# Patient Record
Sex: MALE | Race: WHITE | Employment: OTHER | URBAN - METROPOLITAN AREA
[De-identification: names, ages, dates, MRNs, and addresses within clinical notes are randomized per-mention and may not be internally consistent; named-entity substitution may affect disease eponyms.]

---

## 2018-03-27 ENCOUNTER — HOSPITAL ENCOUNTER (OUTPATIENT)
Dept: MRI IMAGING | Age: 73
Discharge: HOME | End: 2018-03-27
Attending: ORTHOPAEDIC SURGERY
Payer: MEDICARE

## 2018-03-27 DIAGNOSIS — M75.42 IMPINGEMENT SYNDROME OF LEFT SHOULDER: ICD-10-CM

## 2018-03-27 PROCEDURE — 73221 MRI JOINT UPR EXTREM W/O DYE: CPT

## 2018-04-26 ENCOUNTER — HOSPITAL ENCOUNTER (OUTPATIENT)
Dept: PET IMAGING | Age: 73
Discharge: HOME | End: 2018-04-26
Attending: UROLOGY
Payer: MEDICARE

## 2018-04-26 VITALS — HEIGHT: 71 IN | BODY MASS INDEX: 30.1 KG/M2 | WEIGHT: 215 LBS

## 2018-04-26 DIAGNOSIS — C61 MALIGNANT NEOPLASM OF PROSTATE (HCC): ICD-10-CM

## 2018-04-26 PROCEDURE — 78815 PET IMAGE W/CT SKULL-THIGH: CPT

## 2018-04-26 RX ORDER — SODIUM CHLORIDE 0.9 % (FLUSH) 0.9 %
10 SYRINGE (ML) INJECTION
Status: COMPLETED | OUTPATIENT
Start: 2018-04-26 | End: 2018-04-26

## 2018-04-26 RX ADMIN — Medication 10 ML: at 17:40

## 2018-08-17 PROBLEM — M72.0 DUPUYTREN'S CONTRACTURE OF BOTH HANDS: Status: ACTIVE | Noted: 2018-08-17

## 2018-12-05 ENCOUNTER — ANESTHESIA (OUTPATIENT)
Dept: ENDOSCOPY | Age: 73
End: 2018-12-05
Payer: MEDICARE

## 2018-12-05 ENCOUNTER — HOSPITAL ENCOUNTER (OUTPATIENT)
Age: 73
Setting detail: OUTPATIENT SURGERY
Discharge: HOME | End: 2018-12-05
Attending: INTERNAL MEDICINE | Admitting: INTERNAL MEDICINE
Payer: MEDICARE

## 2018-12-05 ENCOUNTER — ANESTHESIA EVENT (OUTPATIENT)
Dept: ENDOSCOPY | Age: 73
End: 2018-12-05
Payer: MEDICARE

## 2018-12-05 VITALS
BODY MASS INDEX: 29.54 KG/M2 | HEIGHT: 71 IN | OXYGEN SATURATION: 97 % | RESPIRATION RATE: 17 BRPM | HEART RATE: 67 BPM | WEIGHT: 211 LBS | DIASTOLIC BLOOD PRESSURE: 76 MMHG | SYSTOLIC BLOOD PRESSURE: 140 MMHG | TEMPERATURE: 97.6 F

## 2018-12-05 PROCEDURE — 74011250637 HC RX REV CODE- 250/637: Performed by: INTERNAL MEDICINE

## 2018-12-05 PROCEDURE — 76060000031 HC ANESTHESIA FIRST 0.5 HR: Performed by: INTERNAL MEDICINE

## 2018-12-05 PROCEDURE — 76040000019: Performed by: INTERNAL MEDICINE

## 2018-12-05 PROCEDURE — 88305 TISSUE EXAM BY PATHOLOGIST: CPT

## 2018-12-05 PROCEDURE — 74011250636 HC RX REV CODE- 250/636

## 2018-12-05 PROCEDURE — 77030013992 HC SNR POLYP ENDOSC BSC -B: Performed by: INTERNAL MEDICINE

## 2018-12-05 PROCEDURE — 74011250636 HC RX REV CODE- 250/636: Performed by: INTERNAL MEDICINE

## 2018-12-05 PROCEDURE — 77030009426 HC FCPS BIOP ENDOSC BSC -B: Performed by: INTERNAL MEDICINE

## 2018-12-05 RX ORDER — FLUMAZENIL 0.1 MG/ML
0.2 INJECTION INTRAVENOUS
Status: DISCONTINUED | OUTPATIENT
Start: 2018-12-05 | End: 2018-12-05 | Stop reason: HOSPADM

## 2018-12-05 RX ORDER — DEXTROMETHORPHAN/PSEUDOEPHED 2.5-7.5/.8
1.2 DROPS ORAL
Status: DISCONTINUED | OUTPATIENT
Start: 2018-12-05 | End: 2018-12-05 | Stop reason: HOSPADM

## 2018-12-05 RX ORDER — LIDOCAINE HYDROCHLORIDE 20 MG/ML
INJECTION, SOLUTION EPIDURAL; INFILTRATION; INTRACAUDAL; PERINEURAL AS NEEDED
Status: DISCONTINUED | OUTPATIENT
Start: 2018-12-05 | End: 2018-12-05 | Stop reason: HOSPADM

## 2018-12-05 RX ORDER — MIDAZOLAM HYDROCHLORIDE 1 MG/ML
.25-5 INJECTION, SOLUTION INTRAMUSCULAR; INTRAVENOUS
Status: DISCONTINUED | OUTPATIENT
Start: 2018-12-05 | End: 2018-12-05 | Stop reason: HOSPADM

## 2018-12-05 RX ORDER — EPINEPHRINE 0.1 MG/ML
1 INJECTION INTRACARDIAC; INTRAVENOUS
Status: DISCONTINUED | OUTPATIENT
Start: 2018-12-05 | End: 2018-12-05 | Stop reason: HOSPADM

## 2018-12-05 RX ORDER — SODIUM CHLORIDE 9 MG/ML
75 INJECTION, SOLUTION INTRAVENOUS CONTINUOUS
Status: DISCONTINUED | OUTPATIENT
Start: 2018-12-05 | End: 2018-12-05 | Stop reason: HOSPADM

## 2018-12-05 RX ORDER — SODIUM CHLORIDE 0.9 % (FLUSH) 0.9 %
5-10 SYRINGE (ML) INJECTION AS NEEDED
Status: DISCONTINUED | OUTPATIENT
Start: 2018-12-05 | End: 2018-12-05 | Stop reason: HOSPADM

## 2018-12-05 RX ORDER — SODIUM CHLORIDE 0.9 % (FLUSH) 0.9 %
5-10 SYRINGE (ML) INJECTION EVERY 8 HOURS
Status: DISCONTINUED | OUTPATIENT
Start: 2018-12-05 | End: 2018-12-05 | Stop reason: HOSPADM

## 2018-12-05 RX ORDER — PROPOFOL 10 MG/ML
INJECTION, EMULSION INTRAVENOUS AS NEEDED
Status: DISCONTINUED | OUTPATIENT
Start: 2018-12-05 | End: 2018-12-05 | Stop reason: HOSPADM

## 2018-12-05 RX ORDER — ATROPINE SULFATE 0.1 MG/ML
0.5 INJECTION INTRAVENOUS
Status: DISCONTINUED | OUTPATIENT
Start: 2018-12-05 | End: 2018-12-05 | Stop reason: HOSPADM

## 2018-12-05 RX ORDER — NALOXONE HYDROCHLORIDE 0.4 MG/ML
0.4 INJECTION, SOLUTION INTRAMUSCULAR; INTRAVENOUS; SUBCUTANEOUS
Status: DISCONTINUED | OUTPATIENT
Start: 2018-12-05 | End: 2018-12-05 | Stop reason: HOSPADM

## 2018-12-05 RX ORDER — FENTANYL CITRATE 50 UG/ML
50 INJECTION, SOLUTION INTRAMUSCULAR; INTRAVENOUS
Status: DISCONTINUED | OUTPATIENT
Start: 2018-12-05 | End: 2018-12-05 | Stop reason: HOSPADM

## 2018-12-05 RX ADMIN — PROPOFOL 430 MG: 10 INJECTION, EMULSION INTRAVENOUS at 14:27

## 2018-12-05 RX ADMIN — SIMETHICONE 80 MG: 20 SUSPENSION/ DROPS ORAL at 14:21

## 2018-12-05 RX ADMIN — LIDOCAINE HYDROCHLORIDE 50 MG: 20 INJECTION, SOLUTION EPIDURAL; INFILTRATION; INTRACAUDAL; PERINEURAL at 14:11

## 2018-12-05 RX ADMIN — SODIUM CHLORIDE: 900 INJECTION, SOLUTION INTRAVENOUS at 14:03

## 2018-12-05 NOTE — DISCHARGE INSTRUCTIONS
Annalisa Cloud  [id number]  1945    COLON DISCHARGE INSTRUCTIONS  Discomfort:  Redness at IV site- apply warm compress to area; if redness or soreness persist- contact your physician  There may be a slight amount of blood passed from the rectum  Gaseous discomfort- walking, belching will help relieve any discomfort  You may not operate a vehicle for 12 hours  You may not engage in an occupation involving machinery or appliances for rest of today  You may not drink alcoholic beverages for at least 12 hours  Avoid making any critical decisions for at least 24 hour  DIET:   Regular diet. - however -  remember your colon is empty and a heavy meal will produce gas. Avoid these foods:  vegetables, fried / greasy foods, carbonated drinks for today  MEDICATION:  Per Medication Reconciliation       ACTIVITY:  You may not resume your normal daily activities until tomorrow AM; it is recommended that you spend the remainder of the day resting -  avoid any strenuous activity. CALL M.D. ANY SIGN OF:   Increasing pain, nausea, vomiting  Abdominal distension (swelling)  New increased bleeding (oral or rectal)  Fever (chills)  Pain in chest area  Bloody discharge from nose or mouth  Shortness of breath    You may not  take any Advil, , Ibuprofen, Motrin, Aleve, or Goodys for 10 days, ONLY  Tylenol as needed for pain. IMPRESSION:  Impression:   1. 10 mm sessile polyp in cecum. Removed by cold snare polypectomy  2. Moderately severe, pan-diverticulosis  3. Large internal hemorrhoids seen on retroflexion    Recommendations:   1.  Repeat colonoscopy in 3 years for surveillance    Follow-up Instructions:   Call Dr. Michael Renteria for the results of procedure / biopsy in 7-10 days  Telephone # [phone]    Yasmin Rg MD

## 2018-12-05 NOTE — PERIOP NOTES
Anesthesia reports 430mg Propofol, 50mg Lidocaine and 300mL NS given during procedure. Received report from anesthesia staff on vital signs and status of patient.

## 2018-12-05 NOTE — ANESTHESIA PREPROCEDURE EVALUATION
Anesthetic History          Comments: Hx of Difficult airway required [facility] for Thyroid Goiter Surgery     Review of Systems / Medical History  Patient summary reviewed, nursing notes reviewed and pertinent labs reviewed    Pulmonary      Recent URI             Neuro/Psych   Within defined limits           Cardiovascular    Hypertension: well controlled          Hyperlipidemia    Exercise tolerance: <4 METS  Comments: 6-12-14 EKG: SB, rate 57  Activity limited by back pain   GI/Hepatic/Renal               Comments: Change in bowel habits, constipation  Hx of diverticulosis, colon polyps Endo/Other      Hypothyroidism: well controlled  Obesity, arthritis and cancer (Prostatectomy 1999)     Other Findings   Comments: DJD           Physical Exam    Airway  Mallampati: III  TM Distance: 4 - 6 cm  Neck ROM: normal range of motion   Mouth opening: Normal     Cardiovascular    Rhythm: regular  Rate: normal         Dental  No notable dental hx       Pulmonary  Breath sounds clear to auscultation               Abdominal  GI exam deferred       Other Findings            Anesthetic Plan    ASA: 2  Anesthesia type: general and total IV anesthesia          Induction: Intravenous  Anesthetic plan and risks discussed with: Patient      Propofol MAC

## 2018-12-05 NOTE — PROCEDURES
NAME:  Hellen Flores   :   1945   MRN:   [id number]     Date/Time:  2018 2:28 PM    Colonoscopy Operative Report    Procedure Type:   Colonoscopy --screening     Indications:     Personal history of colon polyps (screening only)  Pre-operative Diagnosis: see indication above  Post-operative Diagnosis:  See findings below  :  José Salmeron MD  Referring Provider: --Ced Crane MD    Exam:  Airway: clear, no airway problems anticipated  Heart: RRR, without gallops or rubs  Lungs: clear bilaterally without wheezes, crackles, or rhonchi  Abdomen: soft, nontender, nondistended, bowel sounds present  Mental Status: awake, alert and oriented to person, place and time    Sedation:  MAC anesthesia Propofol  Procedure Details:  After informed consent was obtained with all risks and benefits of procedure explained and preoperative exam completed, the patient was taken to the endoscopy suite and placed in the left lateral decubitus position. Upon sequential sedation as per above, a digital rectal exam was performed demonstrating internal hemorrhoids. The Olympus videocolonoscope  was inserted in the rectum and carefully advanced to the cecum, which was identified by the ileocecal valve and appendiceal orifice. The quality of preparation was good. The colonoscope was slowly withdrawn with careful evaluation between folds. Retroflexion in the rectum was completed demonstrating internal hemorrhoids. Findings:   1. 10 mm sessile polyp in cecum. Removed by cold snare polypectomy  2. Moderately severe, pan-diverticulosis  3. Large internal hemorrhoids seen on retroflexion    Specimen Removed:  1. Cecal polyp  Complications: None. EBL:  None. Impression:   1. 10 mm sessile polyp in cecum. Removed by cold snare polypectomy  2. Moderately severe, pan-diverticulosis  3. Large internal hemorrhoids seen on retroflexion    Recommendations:   1.  Repeat colonoscopy in 3 years for surveillance    Discharge Disposition:  Home in the company of a  when able to ambulate.       Caitlin Nicholas MD

## 2018-12-05 NOTE — H&P
Gastroenterology Outpatient History and Physical    Patient: Deann Holliday    Physician: Cassidy Jacobs MD    Chief Complaint: H/o colon polyp  History of Present Illness: No GI complaints    History:  Past Medical History:   Diagnosis Date    DJD (degenerative joint disease) 6/15/2011    History of kidney stones     HTN (hypertension) 6/15/2011    Other ill-defined conditions(799.89)     lipids    Other ill-defined conditions(799.89)     blood transfusion history    Postoperative hypothyroidism 7/15/2016    Prostate cancer ([facility] [address] 6/15/2011    Rotator cuff tendinitis 2009    left    Thyroid disease       Past Surgical History:   Procedure Laterality Date    Twyla Macielmelonie  9/3/2015         ENDOSCOPY, COLON, DIAGNOSTIC  before     polypectomy    ENDOSCOPY, COLON, DIAGNOSTIC  08    diverticulosis,f/u 5 yrs, 13-polypectomy, diverticuli , f/u 3 yr , dr. Santana Never HX OTHER SURGICAL      radical prostatectomy-    HX OTHER SURGICAL      colon polyps    HX PROSTATECTOMY      HX TONSILLECTOMY      THYROIDECTOMY      multinodular goiter-      Social History     Socioeconomic History    Marital status:      Spouse name: Not on file    Number of children: Not on file    Years of education: Not on file    Highest education level: Not on file   Tobacco Use    Smoking status: Former Smoker     Packs/day: 1.50     Years: 20.00     Pack years: 30.00     Last attempt to quit: 1979     Years since quittin.2    Smokeless tobacco: Never Used   Substance and Sexual Activity    Alcohol use: Yes     Comment: very rare    Drug use: No      Family History   Problem Relation Age of Onset    High Cholesterol Brother     Elevated Lipids Brother     Cancer Father         liver    Cancer Sister         skin      Patient Active Problem List   Diagnosis Code    HTN (hypertension) I10    Hyperlipidemia E78.5    Prostate cancer ([facility] [address] C61    DJD (degenerative joint disease) M19.90    Postoperative hypothyroidism E89.0    Dupuytren's contracture of both hands M72.0       Allergies: Allergies   Allergen Reactions    Enalapril Other (comments)     Other reaction(s): Cough    Finasteride Other (comments)     Other reaction(s): Other (comments)  Decreased libido  Decreased libido    Lipitor [Atorvastatin] Myalgia     Other reaction(s): Myalgia    Vasotec [Enalapril Maleate] Cough    Zetia [Ezetimibe] Unknown (comments)     Other reaction(s): Unknown (comments)     Medications:   Prior to Admission medications    Medication Sig Start Date End Date Taking? Authorizing Provider   fenofibrate nanocrystallized (TRICOR) 145 mg tablet TAKE 1 TABLET BY MOUTH EVERY DAY 8/9/18  Yes Claudia Moreno IV, MD   pravastatin (PRAVACHOL) 10 mg tablet TAKE 1 TABLET BY MOUTH ONCE DAILY ** SEND IN CHILD-PROOF    CONTAINER** 4/16/18  Yes Claudia Moreno IV, MD   triamterene-hydroCHLOROthiazide (MAXZIDE) 37.5-25 mg per tablet TAKE 1/2 TABLET BY MOUTH EACH MORNING ** CHILD-PROOF VIAL ** 4/16/18  Yes Claudia Moreno IV, MD   OTHER Tumeric 500 mg bid; Magnesium supplement, an unidentified oil qam, B12 daily . Topical balm   Yes Provider, Historical   levothyroxine (SYNTHROID) 150 mcg tablet Take  by mouth. 1 tab 6 /7d ; 2 tabs Sundays   Yes Provider, Historical   terazosin (HYTRIN) 5 mg capsule TAKE 1 CAPSULE BY MOUTH ONCE DAILY ** SAFETY CAP ** 1/10/17  Yes Claudia Moreno IV, MD   tadalafil (CIALIS) 5 mg tablet Take 5 mg by mouth daily. Yes Provider, Historical   felodipine (PLENDIL SR) 10 mg 24 hr tablet Take 1 Tab by mouth daily. In place of Amlodipine 11/30/18   Claudia Moreno IV, MD     Physical Exam:   Vital Signs: Blood pressure 158/81, pulse 72, temperature 98.4 °F (36.9 °C), resp. rate 19, height 5' 11\" (1.803 m), weight 95.7 kg (211 lb), SpO2 97 %.   General: well developed, well nourished   HEENT: unremarkable   Heart: regular rhythm no mumur    Lungs: clear   Abdominal:  benign   Neurological: unremarkable   Extremities: no edema     Findings/Diagnosis: H/o colon polyp  Plan of Care/Planned Procedure: Colonoscopy with conscious/deep sedation    Signed:  Pat Summers MD 12/5/2018

## 2018-12-05 NOTE — PROGRESS NOTES
Cheri Nugent  1945  [id number]    Situation:  Verbal report received from: Leslie Thorpe RN  Procedure: Procedure(s):  COLONOSCOPY  ENDOSCOPIC POLYPECTOMY    Background:    Preoperative diagnosis: FAMILY HISTORY OF COLON CANCER  Postoperative diagnosis: diverticulosis, polyp, hemorrhoids    :  Dr. Osiel Pablo  Assistant(s): Endoscopy Technician-1: Renita Thorne  Endoscopy RN-1: Balbina Barone RN    Specimens:   ID Type Source Tests Collected by Time Destination   1 : cecal polyp    Florencia Cervantes MD 12/5/2018 1419 Pathology     H. Pylori  no    Assessment:  Intra-procedure medications     Anesthesia gave intra-procedure sedation and medications, see anesthesia flow sheet yes    Intravenous fluids: NS@ KVO     Vital signs stable     Abdominal assessment: round and soft     Recommendation:  Discharge patient per MD order.   Family or Friend   Permission to share finding with family or friend yes

## 2018-12-06 NOTE — ANESTHESIA POSTPROCEDURE EVALUATION
Procedure(s):  COLONOSCOPY  ENDOSCOPIC POLYPECTOMY. Anesthesia Post Evaluation        Patient location during evaluation: PACU  Note status: Adequate. Level of consciousness: responsive to verbal stimuli and sleepy but conscious  Pain management: satisfactory to patient  Airway patency: patent  Anesthetic complications: no  Cardiovascular status: acceptable  Respiratory status: acceptable  Hydration status: acceptable  Comments: +Post-Anesthesia Evaluation and Assessment    Patient: Yonis Jaramillo MRN: [id number]  SSN: [id number]   YOB: 1945  Age: 68 y.o. Sex: male      Cardiovascular Function/Vital Signs    /76   Pulse 67   Temp 36.4 °C (97.6 °F)   Resp 17   Ht 5' 11\" (1.803 m)   Wt 95.7 kg (211 lb)   SpO2 97%   BMI 29.43 kg/m²     Patient is status post Procedure(s):  COLONOSCOPY  ENDOSCOPIC POLYPECTOMY. Nausea/Vomiting: Controlled. Postoperative hydration reviewed and adequate. Pain:  Pain Scale 1: Numeric (0 - 10) (12/05/18 1446)  Pain Intensity 1: 0 (12/05/18 1446)   Managed. Neurological Status: At baseline. Mental Status and Level of Consciousness: Arousable. Pulmonary Status:   O2 Device: Room air (12/05/18 1456)   Adequate oxygenation and airway patent. Complications related to anesthesia: None    Post-anesthesia assessment completed. No concerns.     Signed By: Darrius Echeverria MD    12/6/2018  Post anesthesia nausea and vomiting:  controlled      Visit Vitals  /76   Pulse 67   Temp 36.4 °C (97.6 °F)   Resp 17   Ht 5' 11\" (1.803 m)   Wt 95.7 kg (211 lb)   SpO2 97%   BMI 29.43 kg/m²

## 2020-07-09 ENCOUNTER — HOSPITAL ENCOUNTER (OUTPATIENT)
Dept: CT IMAGING | Age: 75
Discharge: HOME | End: 2020-07-09
Attending: RADIOLOGY
Payer: MEDICARE

## 2020-07-09 ENCOUNTER — HOSPITAL ENCOUNTER (OUTPATIENT)
Dept: NUCLEAR MEDICINE | Age: 75
Discharge: HOME | End: 2020-07-09
Attending: RADIOLOGY
Payer: MEDICARE

## 2020-07-09 DIAGNOSIS — C61 PROSTATE CANCER (HCC): ICD-10-CM

## 2020-07-09 PROCEDURE — 82565 ASSAY OF CREATININE: CPT

## 2020-07-09 PROCEDURE — 78306 BONE IMAGING WHOLE BODY: CPT

## 2020-07-09 PROCEDURE — 74011636320 HC RX REV CODE- 636/320: Performed by: RADIOLOGY

## 2020-07-09 PROCEDURE — 74177 CT ABD & PELVIS W/CONTRAST: CPT

## 2020-07-09 RX ORDER — SODIUM CHLORIDE 0.9 % (FLUSH) 0.9 %
10 SYRINGE (ML) INJECTION
Status: COMPLETED | OUTPATIENT
Start: 2020-07-09 | End: 2020-07-09

## 2020-07-09 RX ADMIN — Medication 40 ML: at 10:08

## 2020-07-09 RX ADMIN — IOPAMIDOL 100 ML: 755 INJECTION, SOLUTION INTRAVENOUS at 10:08

## 2020-07-10 LAB — CREAT BLD-MCNC: 1.1 MG/DL (ref 0.6–1.3)

## 2020-07-14 ENCOUNTER — HOSPITAL ENCOUNTER (OUTPATIENT)
Dept: MRI IMAGING | Age: 75
Discharge: HOME | End: 2020-07-14
Attending: PHYSICIAN ASSISTANT
Payer: MEDICARE

## 2020-07-14 DIAGNOSIS — M54.50 LUMBAR PAIN: ICD-10-CM

## 2020-07-14 PROCEDURE — 72148 MRI LUMBAR SPINE W/O DYE: CPT

## 2021-02-02 ENCOUNTER — HOSPITAL ENCOUNTER (EMERGENCY)
Age: 76
Discharge: HOME | End: 2021-02-03
Attending: EMERGENCY MEDICINE
Payer: MEDICARE

## 2021-02-02 VITALS
WEIGHT: 199.08 LBS | SYSTOLIC BLOOD PRESSURE: 186 MMHG | TEMPERATURE: 98.1 F | DIASTOLIC BLOOD PRESSURE: 88 MMHG | BODY MASS INDEX: 27.87 KG/M2 | HEIGHT: 71 IN | HEART RATE: 78 BPM | OXYGEN SATURATION: 99 % | RESPIRATION RATE: 16 BRPM

## 2021-02-02 DIAGNOSIS — E87.6 HYPOKALEMIA: ICD-10-CM

## 2021-02-02 DIAGNOSIS — E86.0 DEHYDRATION: Primary | ICD-10-CM

## 2021-02-02 DIAGNOSIS — N30.90 CYSTITIS: ICD-10-CM

## 2021-02-02 LAB
ANION GAP SERPL CALC-SCNC: 6 MMOL/L (ref 5–15)
APPEARANCE UR: ABNORMAL
BACTERIA URNS QL MICRO: NEGATIVE /HPF
BILIRUB UR QL CFM: NEGATIVE
BUN SERPL-MCNC: 20 MG/DL (ref 6–20)
BUN/CREAT SERPL: 16 (ref 12–20)
CALCIUM SERPL-MCNC: 9.7 MG/DL (ref 8.5–10.1)
CHLORIDE SERPL-SCNC: 103 MMOL/L (ref 97–108)
CO2 SERPL-SCNC: 30 MMOL/L (ref 21–32)
COLOR UR: ABNORMAL
CREAT SERPL-MCNC: 1.22 MG/DL (ref 0.7–1.3)
EPITH CASTS URNS QL MICRO: ABNORMAL /LPF
GLUCOSE SERPL-MCNC: 119 MG/DL (ref 65–100)
GLUCOSE UR STRIP.AUTO-MCNC: NEGATIVE MG/DL
HGB UR QL STRIP: ABNORMAL
HYALINE CASTS URNS QL MICRO: ABNORMAL /LPF (ref 0–5)
KETONES UR QL STRIP.AUTO: ABNORMAL MG/DL
LEUKOCYTE ESTERASE UR QL STRIP.AUTO: ABNORMAL
NITRITE UR QL STRIP.AUTO: POSITIVE
PH UR STRIP: 6 [PH] (ref 5–8)
POTASSIUM SERPL-SCNC: 3.2 MMOL/L (ref 3.5–5.1)
PROT UR STRIP-MCNC: 100 MG/DL
RBC #/AREA URNS HPF: >100 /HPF (ref 0–5)
SODIUM SERPL-SCNC: 139 MMOL/L (ref 136–145)
SP GR UR REFRACTOMETRY: 1.02 (ref 1–1.03)
UA: UC IF INDICATED,UAUC: ABNORMAL
UROBILINOGEN UR QL STRIP.AUTO: 1 EU/DL (ref 0.2–1)
WBC URNS QL MICRO: ABNORMAL /HPF (ref 0–4)

## 2021-02-02 PROCEDURE — 99284 EMERGENCY DEPT VISIT MOD MDM: CPT

## 2021-02-02 PROCEDURE — 74011250637 HC RX REV CODE- 250/637: Performed by: STUDENT IN AN ORGANIZED HEALTH CARE EDUCATION/TRAINING PROGRAM

## 2021-02-02 PROCEDURE — 74011250636 HC RX REV CODE- 250/636: Performed by: STUDENT IN AN ORGANIZED HEALTH CARE EDUCATION/TRAINING PROGRAM

## 2021-02-02 PROCEDURE — 36415 COLL VENOUS BLD VENIPUNCTURE: CPT

## 2021-02-02 PROCEDURE — 87086 URINE CULTURE/COLONY COUNT: CPT

## 2021-02-02 PROCEDURE — 99283 EMERGENCY DEPT VISIT LOW MDM: CPT

## 2021-02-02 PROCEDURE — 80048 BASIC METABOLIC PNL TOTAL CA: CPT

## 2021-02-02 PROCEDURE — 81001 URINALYSIS AUTO W/SCOPE: CPT

## 2021-02-02 RX ORDER — ACETAMINOPHEN 325 MG/1
975 TABLET ORAL
Status: COMPLETED | OUTPATIENT
Start: 2021-02-02 | End: 2021-02-02

## 2021-02-02 RX ORDER — OXYBUTYNIN CHLORIDE 5 MG/1
5 TABLET, EXTENDED RELEASE ORAL ONCE
Status: COMPLETED | OUTPATIENT
Start: 2021-02-02 | End: 2021-02-02

## 2021-02-02 RX ADMIN — ACETAMINOPHEN 975 MG: 325 TABLET ORAL at 23:08

## 2021-02-02 RX ADMIN — OXYBUTYNIN CHLORIDE 5 MG: 5 TABLET, EXTENDED RELEASE ORAL at 23:26

## 2021-02-02 RX ADMIN — SODIUM CHLORIDE 1000 ML: 9 INJECTION, SOLUTION INTRAVENOUS at 22:54

## 2021-02-03 PROCEDURE — 96360 HYDRATION IV INFUSION INIT: CPT

## 2021-02-03 NOTE — ED NOTES
2213  DR Mendoza at bedside doing bladder scan.    0029  Patient discharge by Reji PARTIDA - pt sent to the front lobby, with strong and steady gait -  Discharge information / home RX / and reasons to return to the ED were reviewed by the doctor.

## 2021-02-03 NOTE — ED PROVIDER NOTES
EMERGENCY DEPARTMENT HISTORY AND PHYSICAL EXAM          Date: 2/2/2021  Patient Name: Pancho Bustamante  Attending of Record: Paul Odonnell    History of Presenting Illness     Chief Complaint   Patient presents with    Urinary Retention     reports that he had a cystoscopy this afternoon to remove a stone; reports that he has not really been able to urinate since 1700       History Provided By: Patient     HPI: Pancho Bustamante is a 77 y.o. male, with past medical history of hypertension, hyperlipidemia, and hypothyroidism who presents the emergency department with inability urinate following urologic procedure earlier today remove the left-sided kidney stone. He reports that he had his left-sided kidney stone removed, but did not have any stent placed. He has only drinking about 3 water bottles worth of fluid over the past 24 hours and feels that he may be dehydrated. He had some left-sided flank pain following his procedure in the afternoon but this had dissipated by the time he arrived in the emergency department. He was given a dose of IV abx following the procedure and has been given Cefitin to start taking tomorrow - he has already picked this up from the pharmacy. PCP: Carmen Mendoza MD    There are no other complaints, changes, or physical findings at this time.      Current Facility-Administered Medications   Medication Dose Route Frequency Provider Last Rate Last Admin    sodium chloride 0.9 % bolus infusion 1,000 mL  1,000 mL IntraVENous ONCE Silke Vora MD 1,000 mL/hr at 02/02/21 2254 1,000 mL at 02/02/21 2254     Current Outpatient Medications   Medication Sig Dispense Refill    felodipine (PLENDIL SR) 10 mg 24 hr tablet TAKE 1 TABLET BY EVERY DAY *IN PLACE OF AMLODIPINE 90 Tab 3    pravastatin (PRAVACHOL) 10 mg tablet TAKE 1 TABLET BY MOUTH ONCE DAILY ** SEND IN CHILD-PROOF    CONTAINER** 90 Tab 1    fenofibrate nanocrystallized (TRICOR) 145 mg tablet TAKE 1 TABLET BY MOUTH EVERY DAY 90 Tab 3    triamterene-hydroCHLOROthiazide (Maxzide-25mg) 37.5-25 mg per tablet Take 0.5 Tabs by mouth daily. Child proof bottle cap 45 Tab 3    sildenafil, antihypertensive, (REVATIO) 20 mg tablet Take 20 mg by mouth. 3 to 5 daily prn      terazosin (HYTRIN) 5 mg capsule Take 5 mg by mouth daily. Child proof bottle cap      OTHER Tumeric 550 mg daily      levothyroxine (SYNTHROID) 150 mcg tablet Take  by mouth.  1 tab 6 /7d ; 2 tabs Sundays         Past History     Past Medical History:  Past Medical History:   Diagnosis Date    DJD (degenerative joint disease) 6/15/2011    History of kidney stones     HTN (hypertension) 6/15/2011    Other ill-defined conditions(799.89)     lipids    Other ill-defined conditions(799.89)     blood transfusion history    Postoperative hypothyroidism 7/15/2016    Prostate cancer ([facility] [address] 6/15/2011    Rotator cuff tendinitis 2009    left    Thyroid disease        Past Surgical History:  Past Surgical History:   Procedure Laterality Date    COLONOSCOPY N/A 12/5/2018    COLONOSCOPY performed by Pooja Yap MD at [address] [place] [address]  9/3/2015         ENDOSCOPY, COLON, DIAGNOSTIC  before 1999    polypectomy    ENDOSCOPY, COLON, DIAGNOSTIC  8/8/08    diverticulosis,f/u 5 yrs, 8/20/13-polypectomy, diverticuli , f/u 3 yr , dr. Andres Gomez HX OTHER SURGICAL      radical prostatectomy-1999    HX OTHER SURGICAL      colon polyps    HX PROSTATECTOMY      HX TONSILLECTOMY      HX UROLOGICAL  10/2018    Kidney stone removal     THYROIDECTOMY      multinodular goiter-2010       Family History:  Family History   Problem Relation Age of Onset    High Cholesterol Brother     Elevated Lipids Brother     Cancer Father         liver    Cancer Sister         skin       Social History:  Social History     Tobacco Use    Smoking status: Former Smoker     Packs/day: 1.50     Years: 20.00 Pack years: 30.00     Quit date: 1979     Years since quittin.4    Smokeless tobacco: Never Used   Substance Use Topics    Alcohol use: Yes     Comment: 3  beers per week     Drug use: No       Allergies: Allergies   Allergen Reactions    Enalapril Other (comments)     Other reaction(s): Cough    Finasteride Other (comments)     Other reaction(s): Other (comments)  Decreased libido  Decreased libido    Lipitor [Atorvastatin] Myalgia     Other reaction(s): Myalgia    Vasotec [Enalapril Maleate] Cough    Zetia [Ezetimibe] Unknown (comments)     Other reaction(s): Unknown (comments)         Review of Systems   Review of Systems   Constitutional: Negative for appetite change and fever. HENT: Negative for sore throat. Eyes: Negative for visual disturbance. Respiratory: Negative for cough and shortness of breath. Cardiovascular: Negative for chest pain. Gastrointestinal: Negative for abdominal pain, diarrhea, nausea and vomiting. Genitourinary: Positive for difficulty urinating and flank pain. Musculoskeletal: Negative for myalgias. Skin: Negative for color change. Neurological: Negative for dizziness and headaches. Psychiatric/Behavioral: Negative for agitation. Physical Exam   Physical Exam  Constitutional:       Appearance: Normal appearance. HENT:      Head: Normocephalic and atraumatic. Mouth/Throat:      Mouth: Mucous membranes are moist.   Eyes:      Conjunctiva/sclera: Conjunctivae normal.      Pupils: Pupils are equal, round, and reactive to light. Neck:      Musculoskeletal: Normal range of motion. Cardiovascular:      Rate and Rhythm: Normal rate and regular rhythm. Pulmonary:      Effort: Pulmonary effort is normal.      Breath sounds: Normal breath sounds. Abdominal:      General: Abdomen is flat. There is no distension. Palpations: Abdomen is soft. Tenderness: There is no guarding or rebound.    Genitourinary:     Comments: No CVA tenderness bl  Musculoskeletal: Normal range of motion. General: No swelling. Skin:     General: Skin is warm and dry. Capillary Refill: Capillary refill takes less than 2 seconds. Neurological:      General: No focal deficit present. Mental Status: He is alert and oriented to person, place, and time.    Psychiatric:         Mood and Affect: Mood normal.        Diagnostic Study Results     Labs -     Recent Results (from the past 12 hour(s))   URINALYSIS W/ REFLEX CULTURE    Collection Time: 02/02/21 10:49 PM    Specimen: Miscellaneous sample; Urine    Urine specimen   Result Value Ref Range    Color DARK YELLOW      Appearance CLOUDY (A) CLEAR      Specific gravity 1.020 1.003 - 1.030      pH (UA) 6.0 5.0 - 8.0      Protein 100 (A) NEG mg/dL    Glucose Negative NEG mg/dL    Ketone TRACE (A) NEG mg/dL    Blood LARGE (A) NEG      Urobilinogen 1.0 0.2 - 1.0 EU/dL    Nitrites Positive (A) NEG      Leukocyte Esterase SMALL (A) NEG      UA:UC IF INDICATED URINE CULTURE ORDERED (A) CNI      WBC 10-20 0 - 4 /hpf    RBC >100 (H) 0 - 5 /hpf    Epithelial cells FEW FEW /lpf    Bacteria Negative NEG /hpf    Hyaline cast 0-2 0 - 5 /lpf   METABOLIC PANEL, BASIC    Collection Time: 02/02/21 10:49 PM   Result Value Ref Range    Sodium 139 136 - 145 mmol/L    Potassium 3.2 (L) 3.5 - 5.1 mmol/L    Chloride 103 97 - 108 mmol/L    CO2 30 21 - 32 mmol/L    Anion gap 6 5 - 15 mmol/L    Glucose 119 (H) 65 - 100 mg/dL    BUN 20 6 - 20 MG/DL    Creatinine 1.22 0.70 - 1.30 MG/DL    BUN/Creatinine ratio 16 12 - 20      GFR est AA >60 >60 ml/min/1.73m2    GFR est non-AA 58 (L) >60 ml/min/1.73m2    Calcium 9.7 8.5 - 10.1 MG/DL   BILIRUBIN, CONFIRM    Collection Time: 02/02/21 10:49 PM   Result Value Ref Range    Bilirubin UA, confirm Negative NEG         Radiologic Studies -   No orders to display     CT Results  (Last 48 hours)    None        CXR Results  (Last 48 hours)    None            Medical Decision Making   I am the first provider for this patient. I reviewed the vital signs, available nursing notes, past medical history, past surgical history, family history and social history. Vital Signs-Reviewed the patient's vital signs. Patient Vitals for the past 12 hrs:   Temp Pulse Resp BP SpO2   02/02/21 2313 -- -- -- -- 99 %   02/02/21 2028 98.1 °F (36.7 °C) 78 16 (!) 186/88 95 %       Records Reviewed: Nursing Notes and Old Medical Records    Provider Notes (Medical Decision Making):   DDx:   Cystits  Hydronephrosis  Dehydration  Stricture    Presents the emergency department with inability to urinate following his procedure earlier today. Spoke to the provider on-call for [facility] is just that he come to the emergency department since he was unable to urinate. On initial assessment here he did not have any hydronephrosis on the right side, he had very mild possible hydronephrosis and a kidney cyst on his left kidney. His urinary bladder was completely collapsed. Will give 1L IVF since he appears dehydrated and check a basic metabolic panel. Will give Tylenol for pain and oxybutinin for bladder spasms. Patient is afebrile and nontoxic appearing. Doubt systemic infection. ED Course and Progress Notes:   Initial assessment performed. The patients presenting problems have been discussed, and they are in agreement with the care plan formulated and outlined with them. I have encouraged them to ask questions as they arise throughout their visit. ED Course as of Feb 02 2354   Tue Feb 02, 2021   2323 Patient with a slight creatinine elevation, but not quite acute kidney injury. Patient with evidence of infection on his urinalysis. He received IV antibiotics after his procedure earlier today. He has a prescription of oral antibiotics to start taking in the morning. He is currently urinating now. Patient stable for discharge. Discussed customary return precautions.   Pt to call his urologist for followup in the morning. His potassium is low, but there is a contraindication to use it at the same time as oxybutinin. We discussed that it should come up with eating a regular diet. We discussed customary return precautions and he voiced understanding.    [WB]      ED Course User Index  [WB] Cynthia Mendoza MD       Diagnosis     Clinical Impression:   1. Dehydration    2. Cystitis    3. Hypokalemia    Hypertension    Disposition:  Home    DISCHARGE PLAN:  1. Current Discharge Medication List        2. Follow-up Information     Follow up With Specialties Details Why [address] [place] [address] MD [facility] In 1 week  [address] [place] [address] [id number]      [facility] EMERGENCY DEPT Emergency Medicine  As needed, If symptoms worsen [address] [facility] [address]   [address] [address] [phone]    [facility]  In 1 day  [facility] [place] [address]  [address]        3. Return to ED if worse         Resident Signature:  Lindsey Gilmore MD, PGY4

## 2021-02-04 LAB
BACTERIA SPEC CULT: NORMAL
SERVICE CMNT-IMP: NORMAL

## 2021-06-20 ENCOUNTER — HOSPITAL ENCOUNTER (EMERGENCY)
Age: 76
Discharge: HOME | End: 2021-06-20
Attending: EMERGENCY MEDICINE
Payer: MEDICARE

## 2021-06-20 ENCOUNTER — APPOINTMENT (OUTPATIENT)
Dept: CT IMAGING | Age: 76
End: 2021-06-20
Attending: EMERGENCY MEDICINE
Payer: MEDICARE

## 2021-06-20 VITALS
OXYGEN SATURATION: 98 % | SYSTOLIC BLOOD PRESSURE: 184 MMHG | TEMPERATURE: 98.2 F | HEIGHT: 71 IN | RESPIRATION RATE: 16 BRPM | DIASTOLIC BLOOD PRESSURE: 72 MMHG | HEART RATE: 56 BPM | WEIGHT: 196.43 LBS | BODY MASS INDEX: 27.5 KG/M2

## 2021-06-20 DIAGNOSIS — N17.9 AKI (ACUTE KIDNEY INJURY) (HCC): ICD-10-CM

## 2021-06-20 DIAGNOSIS — N20.0 KIDNEY STONE: Primary | ICD-10-CM

## 2021-06-20 DIAGNOSIS — I10 ESSENTIAL HYPERTENSION: ICD-10-CM

## 2021-06-20 LAB
ALBUMIN SERPL-MCNC: 3.7 G/DL (ref 3.5–5)
ALBUMIN/GLOB SERPL: 1.1 {RATIO} (ref 1.1–2.2)
ALP SERPL-CCNC: 81 U/L (ref 45–117)
ALT SERPL-CCNC: 17 U/L (ref 12–78)
ANION GAP SERPL CALC-SCNC: 3 MMOL/L (ref 5–15)
APPEARANCE UR: CLEAR
AST SERPL-CCNC: 15 U/L (ref 15–37)
BACTERIA URNS QL MICRO: NEGATIVE /HPF
BASOPHILS # BLD: 0 K/UL (ref 0–0.1)
BASOPHILS NFR BLD: 0 % (ref 0–1)
BILIRUB SERPL-MCNC: 0.5 MG/DL (ref 0.2–1)
BILIRUB UR QL: NEGATIVE
BUN SERPL-MCNC: 26 MG/DL (ref 6–20)
BUN/CREAT SERPL: 16 (ref 12–20)
CALCIUM SERPL-MCNC: 9.3 MG/DL (ref 8.5–10.1)
CHLORIDE SERPL-SCNC: 109 MMOL/L (ref 97–108)
CO2 SERPL-SCNC: 31 MMOL/L (ref 21–32)
COLOR UR: ABNORMAL
CREAT SERPL-MCNC: 1.67 MG/DL (ref 0.7–1.3)
DIFFERENTIAL METHOD BLD: ABNORMAL
EOSINOPHIL # BLD: 0.4 K/UL (ref 0–0.4)
EOSINOPHIL NFR BLD: 4 % (ref 0–7)
EPITH CASTS URNS QL MICRO: ABNORMAL /LPF
ERYTHROCYTE [DISTWIDTH] IN BLOOD BY AUTOMATED COUNT: 13.1 % (ref 11.5–14.5)
GLOBULIN SER CALC-MCNC: 3.3 G/DL (ref 2–4)
GLUCOSE SERPL-MCNC: 116 MG/DL (ref 65–100)
GLUCOSE UR STRIP.AUTO-MCNC: NEGATIVE MG/DL
HCT VFR BLD AUTO: 37.7 % (ref 36.6–50.3)
HGB BLD-MCNC: 11.8 G/DL (ref 12.1–17)
HGB UR QL STRIP: ABNORMAL
IMM GRANULOCYTES # BLD AUTO: 0.1 K/UL (ref 0–0.04)
IMM GRANULOCYTES NFR BLD AUTO: 1 % (ref 0–0.5)
KETONES UR QL STRIP.AUTO: NEGATIVE MG/DL
LEUKOCYTE ESTERASE UR QL STRIP.AUTO: NEGATIVE
LYMPHOCYTES # BLD: 0.6 K/UL (ref 0.8–3.5)
LYMPHOCYTES NFR BLD: 6 % (ref 12–49)
MCH RBC QN AUTO: 28.8 PG (ref 26–34)
MCHC RBC AUTO-ENTMCNC: 31.3 G/DL (ref 30–36.5)
MCV RBC AUTO: 92 FL (ref 80–99)
MONOCYTES # BLD: 0.8 K/UL (ref 0–1)
MONOCYTES NFR BLD: 8 % (ref 5–13)
NEUTS SEG # BLD: 7.9 K/UL (ref 1.8–8)
NEUTS SEG NFR BLD: 81 % (ref 32–75)
NITRITE UR QL STRIP.AUTO: NEGATIVE
NRBC # BLD: 0 K/UL (ref 0–0.01)
NRBC BLD-RTO: 0 PER 100 WBC
OTHER,OTHU: ABNORMAL
PH UR STRIP: 6 [PH] (ref 5–8)
PLATELET # BLD AUTO: 308 K/UL (ref 150–400)
PMV BLD AUTO: 10.1 FL (ref 8.9–12.9)
POTASSIUM SERPL-SCNC: 3.9 MMOL/L (ref 3.5–5.1)
PROT SERPL-MCNC: 7 G/DL (ref 6.4–8.2)
PROT UR STRIP-MCNC: 30 MG/DL
RBC # BLD AUTO: 4.1 M/UL (ref 4.1–5.7)
RBC #/AREA URNS HPF: >100 /HPF (ref 0–5)
RBC MORPH BLD: ABNORMAL
SODIUM SERPL-SCNC: 143 MMOL/L (ref 136–145)
SP GR UR REFRACTOMETRY: 1.02 (ref 1–1.03)
UA: UC IF INDICATED,UAUC: ABNORMAL
UROBILINOGEN UR QL STRIP.AUTO: 0.2 EU/DL (ref 0.2–1)
WBC # BLD AUTO: 9.8 K/UL (ref 4.1–11.1)
WBC URNS QL MICRO: ABNORMAL /HPF (ref 0–4)

## 2021-06-20 PROCEDURE — 85025 COMPLETE CBC W/AUTO DIFF WBC: CPT

## 2021-06-20 PROCEDURE — 87086 URINE CULTURE/COLONY COUNT: CPT

## 2021-06-20 PROCEDURE — 74011250636 HC RX REV CODE- 250/636: Performed by: EMERGENCY MEDICINE

## 2021-06-20 PROCEDURE — 74176 CT ABD & PELVIS W/O CONTRAST: CPT

## 2021-06-20 PROCEDURE — 99284 EMERGENCY DEPT VISIT MOD MDM: CPT

## 2021-06-20 PROCEDURE — 36415 COLL VENOUS BLD VENIPUNCTURE: CPT

## 2021-06-20 PROCEDURE — 96374 THER/PROPH/DIAG INJ IV PUSH: CPT

## 2021-06-20 PROCEDURE — 81001 URINALYSIS AUTO W/SCOPE: CPT

## 2021-06-20 PROCEDURE — 80053 COMPREHEN METABOLIC PANEL: CPT

## 2021-06-20 RX ORDER — KETOROLAC TROMETHAMINE 30 MG/ML
15 INJECTION, SOLUTION INTRAMUSCULAR; INTRAVENOUS
Status: COMPLETED | OUTPATIENT
Start: 2021-06-20 | End: 2021-06-20

## 2021-06-20 RX ADMIN — SODIUM CHLORIDE 1000 ML: 9 INJECTION, SOLUTION INTRAVENOUS at 08:31

## 2021-06-20 RX ADMIN — KETOROLAC TROMETHAMINE 15 MG: 30 INJECTION, SOLUTION INTRAMUSCULAR; INTRAVENOUS at 08:29

## 2021-06-20 NOTE — ED PROVIDER NOTES
EMERGENCY DEPARTMENT HISTORY AND PHYSICAL EXAM      Date: 6/20/2021  Patient Name: Patti Diaz    Please note that this dictation was completed with [employer], the computer voice recognition software. Quite often unanticipated grammatical, syntax, homophones, and other interpretive errors are inadvertently transcribed by the computer software. Please disregard these errors. Please excuse any errors that have escaped final proofreading. History of Presenting Illness     Chief Complaint   Patient presents with    Flank Pain     Patient reports he had a procedure on Wed, he states he awoke at 0100 w L sided flank pain. History Provided By: Patient     HPI: Patti Diaz, 77 y.o. male, presenting the emergency department complaining of right-sided flank pain. Patient has a history of kidney stone. He was recently treated with ureteral stent and recently had a stent removed after it spontaneously dislodged. He rates his pain is moderate, associated with nausea, but no vomiting, no change in stool. No fevers or chills. Does report some hematuria. No other exacerbating or relieving factors or associated symptoms    PCP: Mauricio López MD    No current facility-administered medications on file prior to encounter.      Current Outpatient Medications on File Prior to Encounter   Medication Sig Dispense Refill    triamterene-hydroCHLOROthiazide (MAXZIDE) 37.5-25 mg per tablet TAKE 1/2 TABLET BY MOUTH EVERY MORNING 45 Tab 3    levothyroxine (SYNTHROID) 150 mcg tablet One pill daily Monday through Saturday; skip Sundays; or as  Directed 90 Tab 3    felodipine (PLENDIL SR) 10 mg 24 hr tablet TAKE 1 TABLET BY EVERY DAY *IN PLACE OF AMLODIPINE 90 Tab 3    pravastatin (PRAVACHOL) 10 mg tablet TAKE 1 TABLET BY MOUTH ONCE DAILY ** SEND IN CHILD-PROOF    CONTAINER** 90 Tab 1    fenofibrate nanocrystallized (TRICOR) 145 mg tablet TAKE 1 TABLET BY MOUTH EVERY DAY 90 Tab 3    sildenafil, antihypertensive, (REVATIO) 20 mg tablet Take 20 mg by mouth. 3 to 5 daily prn      terazosin (HYTRIN) 5 mg capsule Take 5 mg by mouth daily.  Child proof bottle cap      OTHER Tumeric 550 mg daily         Past History     Past Medical History:  Past Medical History:   Diagnosis Date    DJD (degenerative joint disease) 6/15/2011    History of kidney stones     HTN (hypertension) 6/15/2011    Lumbar spinal stenosis     Other ill-defined conditions(799.89)     lipids    Other ill-defined conditions(799.89)     blood transfusion history    Postoperative hypothyroidism 7/15/2016    Prostate cancer ([facility] [address] 6/15/2011    Rotator cuff tendinitis 2009    left    Thyroid disease        Past Surgical History:  Past Surgical History:   Procedure Laterality Date    COLONOSCOPY N/A 2018    COLONOSCOPY performed by Aura Teran MD at [address] [place] [address]  9/3/2015         ENDOSCOPY, COLON, DIAGNOSTIC  before     polypectomy    ENDOSCOPY, COLON, DIAGNOSTIC  08    diverticulosis,f/u 5 yrs, 13-polypectomy, diverticuli , f/u 3 yr , dr. Lexi Griffith HX COLONOSCOPY      HX OPEN CHOLECYSTECTOMY      HX ORTHOPAEDIC      b/l Dupuytren's contracture release     HX OTHER SURGICAL      radical prostatectomy-    HX OTHER SURGICAL      colon polyps    HX PROSTATECTOMY      HX TONSILLECTOMY      HX UROLOGICAL  10/2018    Kidney stone removal     [place] THYROIDECTOMY      multinodular goiter-       Family History:  Family History   Problem Relation Age of Onset    High Cholesterol Brother     Elevated Lipids Brother     Cancer Father         liver    Cancer Sister         skin       Social History:  Social History     Tobacco Use    Smoking status: Former Smoker     Packs/day: 1.50     Years: 20.00     Pack years: 30.00     Quit date: 1979     Years since quittin.8    Smokeless tobacco: Never Used   Substance Use Topics    Alcohol use: Yes     Comment: 3 beers per week     Drug use: No       Allergies: Allergies   Allergen Reactions    Enalapril Other (comments)     Other reaction(s): Cough    Finasteride Other (comments)     Other reaction(s): Other (comments)  Decreased libido  Decreased libido    Lipitor [Atorvastatin] Myalgia     Other reaction(s): Myalgia    Vasotec [Enalapril Maleate] Cough    Zetia [Ezetimibe] Unknown (comments)     Other reaction(s): Unknown (comments)         Review of Systems   Review of Systems   Constitutional: Negative for chills and fever. HENT: Negative for congestion and sore throat. Eyes: Negative for visual disturbance. Respiratory: Negative for cough and shortness of breath. Cardiovascular: Negative for chest pain and leg swelling. Gastrointestinal: Positive for abdominal pain and nausea. Negative for blood in stool, diarrhea and vomiting. Endocrine: Negative for polyuria. Genitourinary: Positive for difficulty urinating, flank pain and hematuria. Negative for dysuria and testicular pain. Musculoskeletal: Negative for arthralgias, joint swelling and myalgias. Skin: Negative for rash. Allergic/Immunologic: Negative for immunocompromised state. Neurological: Negative for weakness and headaches. Hematological: Does not bruise/bleed easily. Psychiatric/Behavioral: Negative for confusion. Physical Exam   Physical Exam  Vitals and nursing note reviewed. Constitutional:       Appearance: He is well-developed. HENT:      Head: Normocephalic and atraumatic. Eyes:      General:         Right eye: No discharge. Left eye: No discharge. Conjunctiva/sclera: Conjunctivae normal.      Pupils: Pupils are equal, round, and reactive to light. Neck:      Trachea: No tracheal deviation. Cardiovascular:      Rate and Rhythm: Normal rate and regular rhythm. Heart sounds: Normal heart sounds. No murmur heard.      Pulmonary:      Effort: Pulmonary effort is normal. No respiratory distress. Breath sounds: Normal breath sounds. No wheezing or rales. Abdominal:      General: Bowel sounds are normal.      Palpations: Abdomen is soft. Tenderness: There is no abdominal tenderness. There is right CVA tenderness. There is no guarding or rebound. Musculoskeletal:         General: No tenderness or deformity. Normal range of motion. Cervical back: Normal range of motion and neck supple. Skin:     General: Skin is warm and dry. Findings: No erythema or rash. Neurological:      Mental Status: He is alert and oriented to person, place, and time. Psychiatric:         Behavior: Behavior normal.         Diagnostic Study Results     Labs -     No results found for this or any previous visit (from the past 12 hour(s)). Radiologic Studies -   CT ABD PELV WO CONT   Final Result      1. There is moderately severe left hydroureteronephrosis with 3 tiny   calcifications distal left ureter and question of soft tissue fullness at the   left UVJ. There is a calcification and a small bubble of gas overlying upper   pole left calyx and correlation would be helpful with any recent   instrumentation. There is also a tiny bubble of gas overlying urinary bladder. Patient is status post prostatectomy. No right hydroureteronephrosis is   identified. CT Results  (Last 48 hours)               06/20/21 0839  CT ABD PELV WO CONT Final result    Impression:      1. There is moderately severe left hydroureteronephrosis with 3 tiny   calcifications distal left ureter and question of soft tissue fullness at the   left UVJ. There is a calcification and a small bubble of gas overlying upper   pole left calyx and correlation would be helpful with any recent   instrumentation. There is also a tiny bubble of gas overlying urinary bladder. Patient is status post prostatectomy. No right hydroureteronephrosis is   identified.        Narrative:  EXAM: CT ABD PELV WO CONT       INDICATION: flank pain, kidney stone, recent stent removal. right side       COMPARISON: 2020       CONTRAST:  None. TECHNIQUE:    Thin axial images were obtained through the abdomen and pelvis. Coronal and   sagittal reformats were generated. Oral contrast was not administered. CT dose   reduction was achieved through use of a standardized protocol tailored for this   examination and automatic exposure control for dose modulation. The absence of intravenous contrast material reduces the sensitivity for   evaluation of the vasculature and solid organs. FINDINGS:    LOWER THORAX: There is aortic valvular calcification. There is minor basilar   atelectasis/scarring. There is pleural calcification right lung base. LIVER: No mass. BILIARY TREE: Patient status post cholecystectomy. CBD is not dilated. SPLEEN: within normal limits. PANCREAS: No focal abnormality. ADRENALS: Unremarkable. KIDNEYS/URETERS: There is a calcification overlying left upper pole calyx. There   is a small bubble of gas in the left upper pole calyx. There is moderately   severe left hydroureteronephrosis with 3 tiny calculi distal left ureter. There   is question of a soft tissue density at the left UVJ. There is no right   hydroureteronephrosis. There is a tiny nonobstructing calculus in the right   kidney. There is a small bubble of gas in the urinary bladder. STOMACH: Unremarkable. SMALL BOWEL: No dilatation or wall thickening. COLON: No dilatation or wall thickening. APPENDIX: Surgically absent   PERITONEUM: No ascites or pneumoperitoneum. RETROPERITONEUM: No lymphadenopathy or aortic aneurysm. REPRODUCTIVE ORGANS: Patient is status post prostatectomy. URINARY BLADDER: There is a tiny bubble of gas in the urinary bladder. BONES: No destructive bone lesion. ABDOMINAL WALL: No mass or hernia.    ADDITIONAL COMMENTS: N/A               CXR Results  (Last 48 hours)    None            Medical Decision Making I am the first provider for this patient. I reviewed the vital signs, available nursing notes, past medical history, past surgical history, family history and social history. Vital Signs-Reviewed the patient's vital signs. No data found. Records Reviewed:   Nursing notes, Prior visits     Provider Notes (Medical Decision Making):   Patient with flank pain after ureteral stent removal, likely recurrence of stone versus ureteral spasm, unlikely to be William, on antibiotics. Will obtain noncontrast CT, basic labs provide analgesia. Disposition pending laboratory work-up, imaging    ED Course:   Initial assessment performed. The patients presenting problems have been discussed, and they are in agreement with the care plan formulated and outlined with them. I have encouraged them to ask questions as they arise throughout their visit. I discussed the patient's case with the on-call nurse for [facility] and she discussed it with the on-call attending. They will plan for the patient come tomorrow to [facility] office. They request that he be n.p.o. at midnight. Critical Care Time:   none    Disposition:    DISCHARGE NOTE  Patients results have been reviewed with them. Patient and/or family have verbally conveyed their understanding and agreement of the patient's signs, symptoms, diagnosis, treatment and prognosis and additionally agree to follow up as recommended or return to the Emergency Room should their condition change or have any new concerns prior to their follow-up appointment. Patient verbally agrees with the care-plan and verbally conveys that all of their questions have been answered. Discharge instructions have also been provided to the patient with some educational information regarding their diagnosis as well a list of reasons why they would want to return to the ER prior to their follow-up appointment should their condition change. PLAN:  1.    Discharge Medication List as of 6/20/2021 11:02 AM        2. Follow-up Information     Follow up With Specialties Details Why [id number] [facility] in 1 day  [address]  [phone]          Return to ED if worse     Diagnosis     Clinical Impression:   1. Kidney stone    2. Essential hypertension    3. JACQUES (acute kidney injury) ([facility] [address]        Attestations:   This note was completed by Chelsea Hazel DO

## 2021-06-21 LAB
BACTERIA SPEC CULT: NORMAL
SERVICE CMNT-IMP: NORMAL

## 2021-08-03 ENCOUNTER — TRANSCRIBE ORDER (OUTPATIENT)
Dept: SCHEDULING | Age: 76
End: 2021-08-03

## 2021-08-03 DIAGNOSIS — N13.5 URETER OBSTRUCTION: ICD-10-CM

## 2021-08-03 DIAGNOSIS — N13.30 HYDRONEPHROSIS: Primary | ICD-10-CM

## 2021-08-05 ENCOUNTER — HOSPITAL ENCOUNTER (OUTPATIENT)
Dept: ULTRASOUND IMAGING | Age: 76
Discharge: HOME | End: 2021-08-05
Attending: UROLOGY
Payer: MEDICARE

## 2021-08-05 DIAGNOSIS — N13.5 URETER OBSTRUCTION: ICD-10-CM

## 2021-08-05 DIAGNOSIS — N13.30 HYDRONEPHROSIS: ICD-10-CM

## 2021-08-05 PROCEDURE — 76770 US EXAM ABDO BACK WALL COMP: CPT

## 2021-08-11 ENCOUNTER — TRANSCRIBE ORDER (OUTPATIENT)
Dept: SCHEDULING | Age: 76
End: 2021-08-11

## 2021-08-11 DIAGNOSIS — N13.30 HYDRONEPHROSIS: Primary | ICD-10-CM

## 2021-08-12 ENCOUNTER — TRANSCRIBE ORDER (OUTPATIENT)
Dept: SCHEDULING | Age: 76
End: 2021-08-12

## 2021-08-12 DIAGNOSIS — N13.30 HYDRONEPHROSIS: Primary | ICD-10-CM

## 2021-10-25 ENCOUNTER — TRANSCRIBE ORDER (OUTPATIENT)
Dept: SCHEDULING | Age: 76
End: 2021-10-25

## 2021-10-25 DIAGNOSIS — N13.5 RETROPERITONEAL FIBROSIS: ICD-10-CM

## 2021-10-25 DIAGNOSIS — N13.30 HYDRONEPHROSIS: Primary | ICD-10-CM

## 2021-10-25 DIAGNOSIS — N28.9 RENAL INSUFFICIENCY: ICD-10-CM

## 2021-11-01 ENCOUNTER — HOSPITAL ENCOUNTER (OUTPATIENT)
Dept: NUCLEAR MEDICINE | Age: 76
Discharge: HOME | End: 2021-11-01
Attending: UROLOGY
Payer: MEDICARE

## 2021-11-01 DIAGNOSIS — N13.30 HYDRONEPHROSIS: ICD-10-CM

## 2021-11-01 DIAGNOSIS — N13.5 RETROPERITONEAL FIBROSIS: ICD-10-CM

## 2021-11-01 DIAGNOSIS — N28.9 RENAL INSUFFICIENCY: ICD-10-CM

## 2021-11-01 PROCEDURE — 78708 K FLOW/FUNCT IMAGE W/DRUG: CPT

## 2021-11-01 PROCEDURE — 78707 K FLOW/FUNCT IMAGE W/O DRUG: CPT

## 2021-11-01 PROCEDURE — 74011250636 HC RX REV CODE- 250/636: Performed by: UROLOGY

## 2021-11-01 RX ORDER — BETIATIDE 1 MG/1
10 INJECTION, POWDER, LYOPHILIZED, FOR SOLUTION INTRAVENOUS
Status: COMPLETED | OUTPATIENT
Start: 2021-11-01 | End: 2021-11-01

## 2021-11-01 RX ORDER — FUROSEMIDE 10 MG/ML
20 INJECTION INTRAMUSCULAR; INTRAVENOUS ONCE
Status: COMPLETED | OUTPATIENT
Start: 2021-11-01 | End: 2021-11-01

## 2021-11-01 RX ADMIN — FUROSEMIDE 20 MG: 10 INJECTION, SOLUTION INTRAMUSCULAR; INTRAVENOUS at 10:22

## 2021-11-01 RX ADMIN — BETIATIDE 10 MILLICURIE: 1 INJECTION, POWDER, LYOPHILIZED, FOR SOLUTION INTRAVENOUS at 10:00

## 2021-12-07 ENCOUNTER — ANESTHESIA EVENT (OUTPATIENT)
Dept: ENDOSCOPY | Age: 76
End: 2021-12-07
Payer: MEDICARE

## 2021-12-23 ENCOUNTER — ANESTHESIA (OUTPATIENT)
Dept: ENDOSCOPY | Age: 76
End: 2021-12-23
Payer: MEDICARE

## 2022-02-03 ENCOUNTER — TRANSCRIBE ORDER (OUTPATIENT)
Dept: REGISTRATION | Age: 77
End: 2022-02-03

## 2022-02-03 DIAGNOSIS — Z01.812 PRE-PROCEDURE LAB EXAM: Primary | ICD-10-CM

## 2022-02-10 ENCOUNTER — HOSPITAL ENCOUNTER (OUTPATIENT)
Dept: PREADMISSION TESTING | Age: 77
Discharge: HOME | End: 2022-02-10
Attending: INTERNAL MEDICINE
Payer: MEDICARE

## 2022-02-10 DIAGNOSIS — Z01.812 PRE-PROCEDURE LAB EXAM: ICD-10-CM

## 2022-02-10 LAB
SARS-COV-2, XPLCVT: NOT DETECTED
SOURCE, COVRS: NORMAL

## 2022-02-10 PROCEDURE — U0005 INFEC AGEN DETEC AMPLI PROBE: HCPCS

## 2022-02-14 ENCOUNTER — HOSPITAL ENCOUNTER (OUTPATIENT)
Age: 77
Setting detail: OUTPATIENT SURGERY
Discharge: HOME | End: 2022-02-14
Attending: INTERNAL MEDICINE | Admitting: INTERNAL MEDICINE
Payer: MEDICARE

## 2022-02-14 VITALS
HEIGHT: 71 IN | OXYGEN SATURATION: 96 % | HEART RATE: 69 BPM | RESPIRATION RATE: 20 BRPM | BODY MASS INDEX: 27.58 KG/M2 | DIASTOLIC BLOOD PRESSURE: 73 MMHG | TEMPERATURE: 98.8 F | WEIGHT: 197 LBS | SYSTOLIC BLOOD PRESSURE: 135 MMHG

## 2022-02-14 PROCEDURE — 74011250636 HC RX REV CODE- 250/636: Performed by: NURSE ANESTHETIST, CERTIFIED REGISTERED

## 2022-02-14 PROCEDURE — 2709999900 HC NON-CHARGEABLE SUPPLY: Performed by: INTERNAL MEDICINE

## 2022-02-14 PROCEDURE — 76040000019: Performed by: INTERNAL MEDICINE

## 2022-02-14 PROCEDURE — 74011250636 HC RX REV CODE- 250/636: Performed by: INTERNAL MEDICINE

## 2022-02-14 PROCEDURE — 74011000250 HC RX REV CODE- 250: Performed by: NURSE ANESTHETIST, CERTIFIED REGISTERED

## 2022-02-14 PROCEDURE — 88305 TISSUE EXAM BY PATHOLOGIST: CPT

## 2022-02-14 PROCEDURE — 77030013992 HC SNR POLYP ENDOSC BSC -B: Performed by: INTERNAL MEDICINE

## 2022-02-14 PROCEDURE — 76060000031 HC ANESTHESIA FIRST 0.5 HR: Performed by: INTERNAL MEDICINE

## 2022-02-14 PROCEDURE — 77030009426 HC FCPS BIOP ENDOSC BSC -B: Performed by: INTERNAL MEDICINE

## 2022-02-14 RX ORDER — FENTANYL CITRATE 50 UG/ML
25-200 INJECTION, SOLUTION INTRAMUSCULAR; INTRAVENOUS
Status: DISCONTINUED | OUTPATIENT
Start: 2022-02-14 | End: 2022-02-14 | Stop reason: HOSPADM

## 2022-02-14 RX ORDER — SODIUM CHLORIDE 0.9 % (FLUSH) 0.9 %
5-40 SYRINGE (ML) INJECTION AS NEEDED
Status: DISCONTINUED | OUTPATIENT
Start: 2022-02-14 | End: 2022-02-14 | Stop reason: HOSPADM

## 2022-02-14 RX ORDER — LIDOCAINE HYDROCHLORIDE 20 MG/ML
INJECTION, SOLUTION EPIDURAL; INFILTRATION; INTRACAUDAL; PERINEURAL AS NEEDED
Status: DISCONTINUED | OUTPATIENT
Start: 2022-02-14 | End: 2022-02-14 | Stop reason: HOSPADM

## 2022-02-14 RX ORDER — DEXTROMETHORPHAN/PSEUDOEPHED 2.5-7.5/.8
1.2 DROPS ORAL
Status: DISCONTINUED | OUTPATIENT
Start: 2022-02-14 | End: 2022-02-14 | Stop reason: HOSPADM

## 2022-02-14 RX ORDER — PROPOFOL 10 MG/ML
INJECTION, EMULSION INTRAVENOUS AS NEEDED
Status: DISCONTINUED | OUTPATIENT
Start: 2022-02-14 | End: 2022-02-14 | Stop reason: HOSPADM

## 2022-02-14 RX ORDER — NALOXONE HYDROCHLORIDE 0.4 MG/ML
0.4 INJECTION, SOLUTION INTRAMUSCULAR; INTRAVENOUS; SUBCUTANEOUS
Status: DISCONTINUED | OUTPATIENT
Start: 2022-02-14 | End: 2022-02-14 | Stop reason: HOSPADM

## 2022-02-14 RX ORDER — FLUMAZENIL 0.1 MG/ML
0.2 INJECTION INTRAVENOUS
Status: DISCONTINUED | OUTPATIENT
Start: 2022-02-14 | End: 2022-02-14 | Stop reason: HOSPADM

## 2022-02-14 RX ORDER — SODIUM CHLORIDE 9 MG/ML
50 INJECTION, SOLUTION INTRAVENOUS CONTINUOUS
Status: DISCONTINUED | OUTPATIENT
Start: 2022-02-14 | End: 2022-02-14 | Stop reason: HOSPADM

## 2022-02-14 RX ORDER — SODIUM CHLORIDE 0.9 % (FLUSH) 0.9 %
5-40 SYRINGE (ML) INJECTION EVERY 8 HOURS
Status: DISCONTINUED | OUTPATIENT
Start: 2022-02-14 | End: 2022-02-14 | Stop reason: HOSPADM

## 2022-02-14 RX ORDER — MIDAZOLAM HYDROCHLORIDE 1 MG/ML
.25-5 INJECTION, SOLUTION INTRAMUSCULAR; INTRAVENOUS
Status: DISCONTINUED | OUTPATIENT
Start: 2022-02-14 | End: 2022-02-14 | Stop reason: HOSPADM

## 2022-02-14 RX ORDER — EPINEPHRINE 0.1 MG/ML
1 INJECTION INTRACARDIAC; INTRAVENOUS
Status: DISCONTINUED | OUTPATIENT
Start: 2022-02-14 | End: 2022-02-14 | Stop reason: HOSPADM

## 2022-02-14 RX ORDER — SODIUM CHLORIDE 9 MG/ML
INJECTION, SOLUTION INTRAVENOUS
Status: DISCONTINUED | OUTPATIENT
Start: 2022-02-14 | End: 2022-02-14 | Stop reason: HOSPADM

## 2022-02-14 RX ORDER — ATROPINE SULFATE 0.1 MG/ML
0.5 INJECTION INTRAVENOUS
Status: DISCONTINUED | OUTPATIENT
Start: 2022-02-14 | End: 2022-02-14 | Stop reason: HOSPADM

## 2022-02-14 RX ADMIN — PROPOFOL 40 MG: 10 INJECTION, EMULSION INTRAVENOUS at 11:51

## 2022-02-14 RX ADMIN — PROPOFOL 40 MG: 10 INJECTION, EMULSION INTRAVENOUS at 11:59

## 2022-02-14 RX ADMIN — SODIUM CHLORIDE: 900 INJECTION, SOLUTION INTRAVENOUS at 11:25

## 2022-02-14 RX ADMIN — LIDOCAINE HYDROCHLORIDE 40 MG: 20 INJECTION, SOLUTION EPIDURAL; INFILTRATION; INTRACAUDAL; PERINEURAL at 11:46

## 2022-02-14 RX ADMIN — PROPOFOL 30 MG: 10 INJECTION, EMULSION INTRAVENOUS at 11:49

## 2022-02-14 RX ADMIN — PROPOFOL 90 MG: 10 INJECTION, EMULSION INTRAVENOUS at 11:46

## 2022-02-14 NOTE — PERIOP NOTES

## 2022-02-14 NOTE — H&P
[address]  [address] [facility], [address] [place] [address]      History and Physical       NAME:  Bishnu Gill   :   1945   MRN:   [id number]             History of Present Illness:  Patient is a 77 y.o. who is seen for h/o colon polyps. PMH:  Past Medical History:   Diagnosis Date    Chronic kidney disease     hydronephrosis      DJD (degenerative joint disease) 6/15/2011    History of kidney stones     HTN (hypertension) 6/15/2011    Lumbar spinal stenosis     Other ill-defined conditions(799.89)     lipids    Other ill-defined conditions(799.89)     blood transfusion history    Postoperative hypothyroidism 7/15/2016    Prostate cancer ([address] 6/15/2011     reoccurence of prostate 2020 raditaion treatment    Rotator cuff tendinitis 2009    left    Thyroid disease        PSH:  Past Surgical History:   Procedure Laterality Date    COLONOSCOPY N/A 2018    COLONOSCOPY performed by Ananya Looney MD at [address] [place] [address]  9/3/2015         ENDOSCOPY, COLON, DIAGNOSTIC  before     polypectomy    ENDOSCOPY, COLON, DIAGNOSTIC  08    diverticulosis,f/u 5 yrs, 13-polypectomy, diverticuli , f/u 3 yr , dr. Rachel Thomas      b/l Dupuytren's contracture release     HX OTHER SURGICAL      radical prostatectomy-    HX OTHER SURGICAL      colon polyps    HX PROSTATECTOMY      HX TONSILLECTOMY      HX UROLOGICAL  10/2018    Kidney stone removal     [place] THYROIDECTOMY      multinodular goiter-       Allergies: Allergies   Allergen Reactions    Enalapril Other (comments)     Other reaction(s): Cough    Finasteride Other (comments)     Other reaction(s):  Other (comments)  Decreased libido  Decreased libido    Lipitor [Atorvastatin] Myalgia     Other reaction(s): Myalgia    Vasotec [Enalapril Maleate] Cough    Zetia [Ezetimibe] Unknown (comments)     Other reaction(s): Unknown (comments)       Home Medications:  Prior to Admission Medications   Prescriptions Last Dose Informant Patient Reported? Taking? OTHER   Yes No   Sig: Tumeric 550 mg daily   famotidine (PEPCID) 20 mg tablet   No No   Sig: Take 1 Tablet by mouth two (2) times a day. felodipine (PLENDIL SR) 10 mg 24 hr tablet 2022 at Unknown time  No Yes   Sig: TAKE 1 TABLET BY EVERY DAY *IN PLACE OF AMLODIPINE   fenofibrate nanocrystallized (TRICOR) 145 mg tablet   No No   Sig: TAKE 1 TABLET BY MOUTH EVERY DAY   levothyroxine (SYNTHROID) 150 mcg tablet   No No   Sig: One pill daily Monday through Saturday; skip Sundays; or as  Directed   pravastatin (PRAVACHOL) 10 mg tablet   No No   Sig: TAKE 1 TABLET BY MOUTH EVERY DAY   sildenafil, antihypertensive, (REVATIO) 20 mg tablet   Yes No   Sig: Take 20 mg by mouth. 3 to 5 daily prn   terazosin (HYTRIN) 5 mg capsule   No No   Sig: TAKE 1 CAPSULE BY MOUTH EVERY DAY   triamterene-hydroCHLOROthiazide (MAXZIDE) 37.5-25 mg per tablet 2022 at Unknown time  No Yes   Sig: TAKE 1/2 TABLET BY MOUTH EVERY MORNING      Facility-Administered Medications: None       Hospital Medications:  No current facility-administered medications for this encounter.        Social History:  Social History     Tobacco Use    Smoking status: Former Smoker     Packs/day: 1.50     Years: 20.00     Pack years: 30.00     Quit date: 1979     Years since quittin.4    Smokeless tobacco: Never Used   Substance Use Topics    Alcohol use: Yes     Comment: 3  beers per week        Family History:  Family History   Problem Relation Age of Onset    High Cholesterol Brother     Elevated Lipids Brother     Cancer Father         liver, colon    Cancer Sister         skin         The patient was counseled at length about the risks of emy Covid-19 in the quinton-operative and post-operative states including the recovery window of their procedure. The patient was made aware that emy Covid-19 after a surgical procedure may worsen their prognosis for recovering from the virus and lend to a higher morbidity and or mortality risk. The patient was given the options of postponing their procedure. All of the risks, benefits, and alternatives were discussed. The patient does  wish to proceed with the procedure. Review of Systems:      Constitutional: negative fever, negative chills, negative weight loss  Eyes:   negative visual changes  ENT:   negative sore throat, tongue or lip swelling  Respiratory:  negative cough, negative dyspnea  Cards:  negative for chest pain, palpitations, lower extremity edema  GI:   See HPI  :  negative for frequency, dysuria  Integument:  negative for rash and pruritus  Heme:  negative for easy bruising and gum/nose bleeding  Musculoskel: negative for myalgias,  back pain and muscle weakness  Neuro: negative for headaches, dizziness, vertigo  Psych:  negative for feelings of anxiety, depression       Objective:     Patient Vitals for the past 8 hrs:   Height   02/14/22 1126 (P) 5' 11\" (1.803 m)     No intake/output data recorded. No intake/output data recorded. EXAM:     NEURO-a&o   HEENT-wnl   LUNGS-clear    COR-regular rate and rhythym     ABD-soft , no tenderness, no rebound, good bs     EXT-no edema     Data Review     No results for input(s): WBC, HGB, HCT, PLT, HGBEXT, HCTEXT, PLTEXT in the last 72 hours. No results for input(s): NA, K, CL, CO2, BUN, CREA, GLU, PHOS, CA in the last 72 hours. No results for input(s): AP, TBIL, TP, ALB, GLOB, GGT, AML, LPSE in the last 72 hours. No lab exists for component: SGOT, GPT, AMYP, HLPSE  No results for input(s): INR, PTP, APTT, INREXT in the last 72 hours.        Assessment:     · H/o colon polyps     Patient Active Problem List   Diagnosis Code    HTN (hypertension) I10    Hyperlipidemia E78.5    Prostate cancer ([facility] [address] C61    DJD (degenerative joint disease) M19.90    Postoperative hypothyroidism E89.0    Dupuytren's contracture of both hands M72.0         Plan:   ·   · Endoscopic procedure with sedation     Signed By: Harry Gonzalez MD     2/14/2022  11:39 AM

## 2022-02-14 NOTE — DISCHARGE INSTRUCTIONS
[address], [address] [place] [address]    COLON DISCHARGE INSTRUCTIONS    Caitlin Haro  [id number]  1945    Discomfort:  Redness at IV site- apply warm compress to area; if redness or soreness persist- contact your physician  There may be a slight amount of blood passed from the rectum  Gaseous discomfort- walking, belching will help relieve any discomfort  You may not operate a vehicle for 12 hours  You may not engage in an occupation involving machinery or appliances for rest of today  You may not drink alcoholic beverages for at least 12 hours  Avoid making any critical decisions for at least 24 hour  DIET:  You may resume your regular diet - however -  remember your colon is empty and a heavy meal will produce gas. Avoid these foods:  vegetables, fried / greasy foods, carbonated drinks     ACTIVITY:  You may  resume your normal daily activities it is recommended that you spend the remainder of the day resting -  avoid any strenuous activity. CALL M.D. ANY SIGN OF:   Increasing pain, nausea, vomiting  Abdominal distension (swelling)  New increased bleeding (oral or rectal)  Fever (chills)  Pain in chest area  Bloody discharge from nose or mouth  Shortness of breath      Follow-up Instructions:   Call Dr. Jenni Elam for any questions or problems at [phone]   High fiber diet  Avoid NSAIDS for next 5 days              ENDOSCOPY FINDINGS:   Your colonoscopy showed 5 small polyps removed and diverticulosis.   Telephone # [phone]      Signed By: Jenni Elam MD     2/14/2022  12:15 PM       DISCHARGE SUMMARY from Nurse    The following personal items collected during your admission are returned to you:   Dental Appliance: Dental Appliances: None  Vision: Visual Aid: None  Hearing Aid:    Jewelry:    Clothing:    Other Valuables:    Valuables sent to safe:        Learning About Coronavirus (COVID-19)  Coronavirus (COVID-19): Overview  What is coronavirus (COVID-19)? The coronavirus disease (COVID-19) is caused by a virus. It is an illness that was first found in [place], [place], in December 2019. It has since spread worldwide. The virus can cause fever, cough, and trouble breathing. In severe cases, it can cause pneumonia and make it hard to breathe without help. It can cause death. Coronaviruses are a large group of viruses. They cause the common cold. They also cause more serious illnesses like Middle East respiratory syndrome (MERS) and severe acute respiratory syndrome (SARS). COVID-19 is caused by a novel coronavirus. That means it's a new type that has not been seen in people before. This virus spreads person-to-person through droplets from coughing and sneezing. It can also spread when you are close to someone who is infected. And it can spread when you touch something that has the virus on it, such as a doorknob or a tabletop. What can you do to protect yourself from coronavirus (COVID-19)? The best way to protect yourself from getting sick is to:  · Avoid areas where there is an outbreak. · Avoid contact with people who may be infected. · Wash your hands often with soap or alcohol-based hand sanitizers. · Avoid crowds and try to stay at least 6 feet away from other people. · Wash your hands often, especially after you cough or sneeze. Use soap and water, and scrub for at least 20 seconds. If soap and water aren't available, use an alcohol-based hand . · Avoid touching your mouth, nose, and eyes. What can you do to avoid spreading the virus to others? To help avoid spreading the virus to others:  · Cover your mouth with a tissue when you cough or sneeze. Then throw the tissue in the trash. · Use a disinfectant to clean things that you touch often. · Stay home if you are sick or have been exposed to the virus. Don't go to school, work, or public areas. And don't use public transportation.   · If you are sick:  ? Leave your home only if you need to get medical care. But call the doctor's office first so they know you're coming. And wear a face mask, if you have one.  ? If you have a face mask, wear it whenever you're around other people. It can help stop the spread of the virus when you cough or sneeze. ? Clean and disinfect your home every day. Use household  and disinfectant wipes or sprays. Take special care to clean things that you grab with your hands. These include doorknobs, remote controls, phones, and handles on your refrigerator and microwave. And don't forget countertops, tabletops, bathrooms, and computer keyboards. When to call for help  Call 911 anytime you think you may need emergency care. For example, call if:  · You have severe trouble breathing. (You can't talk at all.)  · You have constant chest pain or pressure. · You are severely dizzy or lightheaded. · You are confused or can't think clearly. · Your face and lips have a blue color. · You pass out (lose consciousness) or are very hard to wake up. Call your doctor now if you develop symptoms such as:  · Shortness of breath. · Fever. · Cough. If you need to get care, call ahead to the doctor's office for instructions before you go. Make sure you wear a face mask, if you have one, to prevent exposing other people to the virus. Where can you get the latest information? The following health organizations are tracking and studying this virus. Their websites contain the most up-to-date information. Mary Trivedi also learn what to do if you think you may have been exposed to the virus. · U.S. Centers for Disease Control and Prevention (CDC): The CDC provides updated news about the disease and travel advice. The website also tells you how to prevent the spread of infection. www.cdc.gov  · World Health Organization [facility]): WHO offers information about the virus outbreaks.  WHO also has travel advice. www.who.int  Current as of: April 1, 2020               Content Version: 12.4  © 5929-6423 Healthwise, [employer]. Care instructions adapted under license by your healthcare professional. If you have questions about a medical condition or this instruction, always ask your healthcare professional. [address] any warranty or liability for your use of this information. Patient Education        Colon Polyps: Care Instructions  Your Care Instructions     Colon polyps are growths in the colon or the rectum. The cause of most colon polyps is not known, and most people who get them do not have any problems. But a certain kind can turn into cancer. For this reason, regular testing for colon polyps is important for people as they get older. It is also important for anyone who has an increased risk for colon cancer. Polyps are usually found through routine colon cancer screening tests. Although most colon polyps are not cancerous, they are usually removed and then tested for cancer. Screening for colon cancer saves lives because the cancer can usually be cured if it is caught early. If you have a polyp that is the type that can turn into cancer, you may need more tests to examine your entire colon. The doctor will remove any other polyps that he or she finds, and you will be tested more often. Follow-up care is a key part of your treatment and safety. Be sure to make and go to all appointments, and call your doctor if you are having problems. It's also a good idea to know your test results and keep a list of the medicines you take. How can you care for yourself at home? Regular exams to look for colon polyps are the best way to prevent polyps from turning into colon cancer. These can include stool tests, sigmoidoscopy, colonoscopy, and CT colonography. Talk with your doctor about a testing schedule that is right for you. To prevent polyps  There is no home treatment that can prevent colon polyps. But these steps may help lower your risk for cancer.   · Stay active. Being active can help you get to and stay at a healthy weight. Try to exercise on most days of the week. Walking is a good choice. · Eat well. Choose a variety of vegetables, fruits, legumes (such as peas and beans), fish, poultry, and whole grains. · Do not smoke. If you need help quitting, talk to your doctor about stop-smoking programs and medicines. These can increase your chances of quitting for good. · If you drink alcohol, limit how much you drink. Limit alcohol to 2 drinks a day for men and 1 drink a day for women. When should you call for help? Call your doctor now or seek immediate medical care if:    · You have severe belly pain.     · Your stools are maroon or very bloody. Watch closely for changes in your health, and be sure to contact your doctor if:    · You have a fever.     · You have nausea or vomiting.     · You have a change in bowel habits (new constipation or diarrhea).     · Your symptoms get worse or are not improving as expected. Where can you learn more? Go to http://www.ronquillo.com/  Enter C571 in the search box to learn more about \"Colon Polyps: Care Instructions. \"  Current as of: December 17, 2020               Content Version: 13.0  © 4974-6680 Healthwise, Incorporated. Care instructions adapted under license by [employer] (which disclaims liability or warranty for this information). If you have questions about a medical condition or this instruction, always ask your healthcare professional. [place] [address] any warranty or liability for your use of this information.

## 2022-02-14 NOTE — ANESTHESIA PREPROCEDURE EVALUATION
Anesthetic History          Comments: Hx of Difficult airway required [place] [facility] for Thyroid Goiter Surgery     Review of Systems / Medical History  Patient summary reviewed, nursing notes reviewed and pertinent labs reviewed    Pulmonary      Recent URI             Neuro/Psych   Within defined limits           Cardiovascular    Hypertension: well controlled          Hyperlipidemia    Exercise tolerance: <4 METS  Comments: 6-12-14 EKG: SB, rate 57  Activity limited by back pain   GI/Hepatic/Renal               Comments: Change in bowel habits, constipation  Hx of diverticulosis, colon polyps Endo/Other      Hypothyroidism: well controlled  Obesity, arthritis and cancer (Prostatectomy 1999)     Other Findings   Comments: DJD           Physical Exam    Airway  Mallampati: III  TM Distance: 4 - 6 cm  Neck ROM: normal range of motion   Mouth opening: Normal     Cardiovascular    Rhythm: regular  Rate: normal         Dental  No notable dental hx       Pulmonary  Breath sounds clear to auscultation               Abdominal  GI exam deferred       Other Findings            Anesthetic Plan    ASA: 2  Anesthesia type: MAC          Induction: Intravenous  Anesthetic plan and risks discussed with: Patient      Propofol MAC

## 2022-02-14 NOTE — PROCEDURES
[address] [facility] [address]  [address] [facility], [address] [place] [address]      Colonoscopy Operative Report    Darryl Nolan  [id number]  1945      Procedure Type:   Colonoscopy with polypectomy (snare cautery)     Indications:    Family history of coloretal cancer (screening only), Personal history of colon polyps (screening only)       Pre-operative Diagnosis: see indication above    Post-operative Diagnosis:  See findings below    :  Cassi Hebert MD    Surgical Assistant: Endoscopy Technician-1: Desire Leon  Endoscopy RN-1: Rajani To    Implants:  None    Referring Provider: Zoey Trinh MD      Sedation:  MAC anesthesia Propofol      Procedure Details:  After informed consent was obtained with all risks and benefits of procedure explained and preoperative exam completed, the patient was taken to the endoscopy suite and placed in the left lateral decubitus position. Upon sequential sedation as per above, a digital rectal exam was performed demonstrating internal hemorrhoids. The Olympus videocolonoscope  was inserted in the rectum and carefully advanced to the cecum, which was identified by the ileocecal valve and appendiceal orifice. The cecum was identified by the ileocecal valve and appendiceal orifice. The quality of preparation was good. The colonoscope was slowly withdrawn with careful evaluation between folds. Retroflexion in the rectum was completed . Findings:   Rectum: normal  small internal hemorrhoids, with small skin tag  Sigmoid: normal  Descending Colon: normal  Transverse Colon:  1cm sessile polyp removed by hot biopsies    1.5 cm pedunculated polyp, removed by hot snaring    Ascending Colon: 1 cm sessile polyp removed by hot biopsy    Cecum: 2 sessile polyps, around 1 cm in size each, removed by hot biopsies    Diffuse moderate diverticulosis, seen throughout colon    Specimen Removed:  as above    Complications: None.      EBL: None.    Impression:    see findings    Recommendations: --Await pathology.       Recommendation for next colonscopy in 3 years by his GI, Dr Fazal Bergeron fiber diet  Avoid NSAIDS for next 5 days    Signed By: Madelin Rob MD     2/14/2022  12:11 PM

## 2022-02-15 NOTE — ANESTHESIA POSTPROCEDURE EVALUATION
Post-Anesthesia Evaluation and Assessment    Patient: Aryan Jimenez MRN: [id number]  SSN: [id number]    YOB: 1945  Age: 77 y.o. Sex: male      I have evaluated the patient and they are stable and ready for discharge from the PACU. Cardiovascular Function/Vital Signs  Visit Vitals  /73   Pulse 69   Temp 37.1 °C (98.8 °F)   Resp 20   Ht 5' 11\" (1.803 m)   Wt 89.4 kg (197 lb)   SpO2 96%   BMI 27.48 kg/m²       Patient is status post MAC anesthesia for Procedure(s):  COLONOSCOPY WITH BIOPSY   :-  ENDOSCOPIC POLYPECTOMY. Nausea/Vomiting: None    Postoperative hydration reviewed and adequate. Pain:  Pain Scale 1: Numeric (0 - 10) (02/14/22 1225)  Pain Intensity 1: 0 (02/14/22 1225)   Managed    Neurological Status: At baseline    Mental Status, Level of Consciousness: Alert and  oriented to person, place, and time    Pulmonary Status:   O2 Device: None (Room air) (02/14/22 1225)   Adequate oxygenation and airway patent    Complications related to anesthesia: None    Post-anesthesia assessment completed. No concerns    Signed By: Melissa Rios MD     February 15, 2022              Procedure(s):  COLONOSCOPY WITH BIOPSY   :-  ENDOSCOPIC POLYPECTOMY. MAC    <BSHSIANPOST>    INITIAL Post-op Vital signs:   Vitals Value Taken Time   BP 83/54 02/14/22 1247   Temp     Pulse 77 02/14/22 1229   Resp 15 02/14/22 1229   SpO2 96 % 02/14/22 1228   Vitals shown include unvalidated device data.

## 2022-03-19 PROBLEM — M72.0 DUPUYTREN'S CONTRACTURE OF BOTH HANDS: Status: ACTIVE | Noted: 2018-08-17

## 2022-05-25 PROBLEM — N18.30 CHRONIC RENAL DISEASE, STAGE III (HCC): Status: ACTIVE | Noted: 2022-05-25

## 2022-06-24 ENCOUNTER — TRANSCRIBE ORDER (OUTPATIENT)
Dept: SCHEDULING | Age: 77
End: 2022-06-24

## 2022-06-24 DIAGNOSIS — N13.30 UNSPECIFIED HYDRONEPHROSIS: Primary | ICD-10-CM

## 2022-08-03 ENCOUNTER — HOSPITAL ENCOUNTER (OUTPATIENT)
Dept: ULTRASOUND IMAGING | Age: 77
Discharge: HOME | End: 2022-08-03
Payer: MEDICARE

## 2022-08-03 DIAGNOSIS — N13.30 UNSPECIFIED HYDRONEPHROSIS: ICD-10-CM

## 2022-08-03 PROCEDURE — 76770 US EXAM ABDO BACK WALL COMP: CPT

## 2023-06-30 ENCOUNTER — HOSPITAL ENCOUNTER (EMERGENCY)
Facility: HOSPITAL | Age: 78
Discharge: HOME | End: 2023-06-30
Attending: EMERGENCY MEDICINE
Payer: MEDICARE

## 2023-06-30 VITALS
SYSTOLIC BLOOD PRESSURE: 174 MMHG | RESPIRATION RATE: 18 BRPM | DIASTOLIC BLOOD PRESSURE: 76 MMHG | TEMPERATURE: 97.9 F | BODY MASS INDEX: 28.4 KG/M2 | OXYGEN SATURATION: 96 % | WEIGHT: 202.82 LBS | HEIGHT: 71 IN | HEART RATE: 60 BPM

## 2023-06-30 DIAGNOSIS — R39.198 DIFFICULTY URINATING: ICD-10-CM

## 2023-06-30 DIAGNOSIS — Z96.0 STATUS POST IMPLANTATION OF ARTIFICIAL URINARY SPHINCTER: Primary | ICD-10-CM

## 2023-06-30 LAB
APPEARANCE UR: ABNORMAL
BACTERIA URNS QL MICRO: NEGATIVE /HPF
BILIRUB UR QL: NEGATIVE
COLOR UR: ABNORMAL
EPITH CASTS URNS QL MICRO: ABNORMAL /LPF
GLUCOSE UR STRIP.AUTO-MCNC: NEGATIVE MG/DL
HGB UR QL STRIP: NEGATIVE
HYALINE CASTS URNS QL MICRO: ABNORMAL /LPF (ref 0–2)
KETONES UR QL STRIP.AUTO: NEGATIVE MG/DL
LEUKOCYTE ESTERASE UR QL STRIP.AUTO: ABNORMAL
NITRITE UR QL STRIP.AUTO: NEGATIVE
PH UR STRIP: 5.5 (ref 5–8)
PROT UR STRIP-MCNC: NEGATIVE MG/DL
RBC #/AREA URNS HPF: ABNORMAL /HPF (ref 0–5)
SP GR UR REFRACTOMETRY: 1.01
URINE CULTURE IF INDICATED: ABNORMAL
UROBILINOGEN UR QL STRIP.AUTO: 0.2 EU/DL (ref 0.2–1)
WBC URNS QL MICRO: ABNORMAL /HPF (ref 0–4)

## 2023-06-30 PROCEDURE — 81001 URINALYSIS AUTO W/SCOPE: CPT

## 2023-06-30 PROCEDURE — 99283 EMERGENCY DEPT VISIT LOW MDM: CPT

## 2023-06-30 PROCEDURE — 51798 US URINE CAPACITY MEASURE: CPT

## 2023-06-30 ASSESSMENT — PAIN - FUNCTIONAL ASSESSMENT: PAIN_FUNCTIONAL_ASSESSMENT: NONE - DENIES PAIN

## 2024-09-06 ENCOUNTER — HOSPITAL ENCOUNTER (OUTPATIENT)
Facility: HOSPITAL | Age: 79
Discharge: HOME | End: 2024-09-09
Payer: MEDICARE

## 2024-09-06 DIAGNOSIS — M19.049 HAND ARTHRITIS: ICD-10-CM

## 2024-09-06 PROCEDURE — 73130 X-RAY EXAM OF HAND: CPT

## 2024-11-19 ENCOUNTER — HOSPITAL ENCOUNTER (OUTPATIENT)
Facility: HOSPITAL | Age: 79
Discharge: HOME | End: 2024-11-22
Payer: MEDICARE

## 2024-11-19 ENCOUNTER — TRANSCRIBE ORDERS (OUTPATIENT)
Facility: HOSPITAL | Age: 79
End: 2024-11-19

## 2024-11-19 DIAGNOSIS — M54.2 NECK PAIN: ICD-10-CM

## 2024-11-19 DIAGNOSIS — M54.2 NECK PAIN: Primary | ICD-10-CM

## 2024-11-19 PROCEDURE — 72050 X-RAY EXAM NECK SPINE 4/5VWS: CPT

## 2025-08-21 ENCOUNTER — TRANSCRIBE ORDERS (OUTPATIENT)
Facility: HOSPITAL | Age: 80
End: 2025-08-21

## 2025-08-21 DIAGNOSIS — Z90.49 S/P CHOLECYSTECTOMY: ICD-10-CM

## 2025-08-21 DIAGNOSIS — K85.00 IDIOPATHIC ACUTE PANCREATITIS, UNSPECIFIED COMPLICATION STATUS: Primary | ICD-10-CM

## 2025-08-21 DIAGNOSIS — Z15.89 CHEK2 GENE MUTATION POSITIVE: ICD-10-CM

## 2025-08-21 DIAGNOSIS — Z85.46 HISTORY OF PROSTATE CANCER: ICD-10-CM

## (undated) DEVICE — FORCEPS BX L240CM JAW DIA2.8MM L CAP W/ NDL MIC MESH TOOTH

## (undated) DEVICE — SOLIDIFIER MEDC 1200ML -- CONVERT TO 356117

## (undated) DEVICE — FCPS BX HOT RJ4 2.2MMX240CM -- RADIAL JAW 4 BX/40

## (undated) DEVICE — BASIN EMSIS 16OZ GRAPHITE PLAS KID SHP MOLD GRAD FOR ORAL

## (undated) DEVICE — Device

## (undated) DEVICE — CONTAINER SPEC 20 ML LID NEUT BUFF FORMALIN 10 % POLYPR STS

## (undated) DEVICE — SNARE ENDOSCP M L240CM W27MM SHTH DIA2.4MM CHN 2.8MM OVL

## (undated) DEVICE — SYRINGE 50ML E/T

## (undated) DEVICE — NEONATAL-ADULT SPO2 SENSOR: Brand: NELLCOR

## (undated) DEVICE — SYR 10ML LUER LOK 1/5ML GRAD --

## (undated) DEVICE — 1200 GUARD II KIT W/5MM TUBE W/O VAC TUBE: Brand: GUARDIAN

## (undated) DEVICE — TRAP SURG QUAD PARABOLA SLOT DSGN SFTY SCRN TRAPEASE

## (undated) DEVICE — KENDALL RADIOLUCENT FOAM MONITORING ELECTRODE RECTANGULAR SHAPE: Brand: KENDALL

## (undated) DEVICE — Z DISCONTINUED PER MEDLINE LINE GAS SAMPLING O2/CO2 LNG AD 13 FT NSL W/ TBNG FILTERLINE

## (undated) DEVICE — SET ADMIN 16ML TBNG L100IN 2 Y INJ SITE IV PIGGY BK DISP

## (undated) DEVICE — TOWEL 4 PLY TISS 19X30 SUE WHT

## (undated) DEVICE — STRAINER URIN CALC RNL MSH -- CONVERT TO ITEM 357634

## (undated) DEVICE — NEEDLE HYPO 18GA L1.5IN PNK S STL HUB POLYPR SHLD REG BVL

## (undated) DEVICE — TRAP SUC MUCOUS 70ML -- MEDICHOICE MEDLINE

## (undated) DEVICE — REM POLYHESIVE ADULT PATIENT RETURN ELECTRODE: Brand: VALLEYLAB

## (undated) DEVICE — SYR 3ML LL TIP 1/10ML GRAD --

## (undated) DEVICE — CATH IV AUTOGRD BC PNK 20GA 25 -- INSYTE